# Patient Record
Sex: MALE | Race: BLACK OR AFRICAN AMERICAN
[De-identification: names, ages, dates, MRNs, and addresses within clinical notes are randomized per-mention and may not be internally consistent; named-entity substitution may affect disease eponyms.]

---

## 2020-11-24 ENCOUNTER — HOSPITAL ENCOUNTER (EMERGENCY)
Dept: HOSPITAL 72 - EMR | Age: 65
Discharge: HOME | End: 2020-11-24
Payer: MEDICARE

## 2020-11-24 VITALS — SYSTOLIC BLOOD PRESSURE: 142 MMHG | DIASTOLIC BLOOD PRESSURE: 85 MMHG

## 2020-11-24 VITALS — DIASTOLIC BLOOD PRESSURE: 85 MMHG | SYSTOLIC BLOOD PRESSURE: 142 MMHG

## 2020-11-24 VITALS — WEIGHT: 155 LBS | HEIGHT: 68 IN | BODY MASS INDEX: 23.49 KG/M2

## 2020-11-24 DIAGNOSIS — E11.9: ICD-10-CM

## 2020-11-24 DIAGNOSIS — K02.9: Primary | ICD-10-CM

## 2020-11-24 PROCEDURE — 99282 EMERGENCY DEPT VISIT SF MDM: CPT

## 2020-11-24 NOTE — NUR
ED Nurse Note:



Pt walked into the ED from home with c/o toothache onset 1 week. Pt came in 
with swellling on left molar area with pain that radiates to the ear. Pt is 
scheduled to see his dentist melinda for tooth extraction. pain 10/10, denies 
injury/trauma, no fever/chills. pt is taking pain and antibiotics

## 2020-11-24 NOTE — EMERGENCY ROOM REPORT
History of Present Illness


General


Chief Complaint:  Toothache


Source:  Patient





Present Illness


HPI


Is a 65-year-old male with history of diabetes.  He presents with chief 

complaint of dental pain.  Onset for last 2 days.  Localized to the left lower 

jaw.  He said he is supposed to get it extracted.  No fever chest pain or 

swelling.  Nothing made it better.  Eating or drinking made it worse.


Allergies:  


Coded Allergies:  


     No Known Allergies (Unverified , 11/24/20)





COVID-19 Screening


Contact w/high risk pt:  No


Experienced COVID-19 symptoms?:  No


COVID-19 Testing performed PTA:  No


COVID-19 Screening:  Negative COVID-19


COVID-19 Testing Source:  [place]





Patient History


Past Medical History:  see triage record, old chart reviewed, DM


Past Surgical History:  other


Pertinent Family History:  none


Social History:  Denies: smoking


Immunizations:  other


Reviewed Nursing Documentation:  PMH: Agreed; PSxH: Agreed





Nursing Documentation-PMH


Hx Diabetes:  Yes


Hx Neurological Problems:  Yes





Review of Systems


Eye:  Denies: eye pain, blurred vision


ENT:  Denies: ear pain, nose congestion, throat swelling


Respiratory:  Denies: cough, shortness of breath


Cardiovascular:  Denies: chest pain, palpitations


Gastrointestinal:  Denies: abdominal pain, diarrhea, nausea, vomiting


Musculoskeletal:  Denies: back pain, joint pain


Skin:  Denies: rash


Neurological:  Denies: headache, numbness


Endocrine:  Denies: increased thirst, increased urine


Hematologic/Lymphatic:  Denies: easy bruising


All Other Systems:  negative except mentioned in HPI





Physical Exam





Vital Signs








  Date Time  Temp Pulse Resp B/P (MAP) Pulse Ox O2 Delivery O2 Flow Rate FiO2


 


11/24/20 04:26 98.4 74 20 142/85 (104) 98 Room Air  





Vitals unremarkable


Sp02 EP Interpretation:  reviewed, normal


General Appearance:  well appearing, no apparent distress, alert


Head:  normocephalic, atraumatic


Eyes:  bilateral eye PERRL, bilateral eye EOMI


ENT:  hearing grossly normal, normal pharynx, other - Diffuse dental decay.  

Left lower jaw only has a decay left third molar.  No abscess seen.


Neck:  full range of motion, supple, no meningismus


Respiratory:  chest non-tender, lungs clear, normal breath sounds


Cardiovascular #1:  regular rate, rhythm, no murmur


Gastrointestinal:  normal bowel sounds, non tender, no mass, no organomegaly, no

bruit, non-distended


Musculoskeletal:  back normal, normal range of motion, gait/station normal


Psychiatric:  mood/affect normal





Medical Decision Making


Diagnostic Impression:  


   Primary Impression:  


   Toothache


ER Course


This patient presents with dental pain.  No evidence of any abscess to be I&D.  

Will discharge home.





Last Vital Signs








  Date Time  Temp Pulse Resp B/P (MAP) Pulse Ox O2 Delivery O2 Flow Rate FiO2


 


11/24/20 04:57 98.4 78 20 142/85 98 Room Air  








Status:  improved


Disposition:  HOME, SELF-CARE


Condition:  Stable


Scripts


Ibuprofen* (MOTRIN*) 600 Mg Tablet


600 MG ORAL Q6H PRN for For Pain, #30 TAB 0 Refills


   Prov: Wilman Wokrman MD         11/24/20 


Hydrocodone/Acetaminophen 5-325* (HYDROCODONE/ACETAMINOPHEN 5-325*) 1 Each 

Tablet


1 TAB ORAL Q6H PRN for For Pain, #5 TAB 0 Refills


   Prov: Wilman Workman MD         11/24/20 


Amoxicillin* (AMOXIL*) 500 Mg Capsule


500 MG ORAL THREE TIMES A DAY, #21 CAP


   Prov: Wilman Workman MD         11/24/20


Referrals:  


[facility] (PCP)


Patient Instructions:  Dental Pain





Additional Instructions:  


Follow-up with dentist ASAP.  Return if symptoms worsen.











Wilman Workman MD                  Nov 24, 2020 05:11

## 2021-01-27 ENCOUNTER — HOSPITAL ENCOUNTER (EMERGENCY)
Dept: HOSPITAL 72 - EMR | Age: 66
Discharge: HOME | End: 2021-01-27
Payer: MEDICARE

## 2021-01-27 VITALS — DIASTOLIC BLOOD PRESSURE: 84 MMHG | SYSTOLIC BLOOD PRESSURE: 127 MMHG

## 2021-01-27 VITALS — DIASTOLIC BLOOD PRESSURE: 86 MMHG | SYSTOLIC BLOOD PRESSURE: 123 MMHG

## 2021-01-27 VITALS — HEIGHT: 68 IN | WEIGHT: 155 LBS | BODY MASS INDEX: 23.49 KG/M2
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
## 2021-01-27 DIAGNOSIS — S16.1XXA: Primary | ICD-10-CM
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
## 2021-01-27 DIAGNOSIS — E11.9: ICD-10-CM

## 2021-01-27 DIAGNOSIS — M54.5: ICD-10-CM

## 2021-01-27 PROCEDURE — 99283 EMERGENCY DEPT VISIT LOW MDM: CPT

## 2021-01-27 PROCEDURE — 96372 THER/PROPH/DIAG INJ SC/IM: CPT

## 2021-01-27 NOTE — EMERGENCY ROOM REPORT
History of Present Illness


General


Chief Complaint:  Back Pain-No Injury


Source:  Patient





Present Illness


HPI


Patient presents with neck pain.  This is worsened over the last few days.  

Feels stiffness and has muscle aching.  He rates the pain 8/10.  It radiates 

down his upper back and also towards the shoulder.  He has difficulty raising 

his right arm and could not use a stick shift to bring himself to the emergency 

department because of the limitation in his right arm.  He denies any numbness. 

Is been taking Motrin with minimal relief.  He also is taking his daughters 

Naprosyn.  His last dose was earlier this morning.  Denies numbness or weakness 

in his hands.  There is no recent trauma.





The patient has chronic lower back problems.  He injured his lower back at work 

and there is litigation involved with this.  He does complain of some increased 

lower back pain at this time but it is not a significant diastasis neck.  He 

denies that his neck was involved with injury before.  However according to the 

triage note both problems have been present for 20 years.  Apparently physical 

therapy has been held up due to litigation.





The patient denies exposure to Covid positive contacts.





No fevers, chills, sore throat, chest pain, palpitations, nausea, vomiting, 

diarrhea, dysuria, abdominal pain, shortness of breath, rashes, visual changes, 

dizziness, headache.





In his history he reports suffering from depression and anxiety.


Allergies:  


Coded Allergies:  


     No Known Allergies (Unverified , 11/24/20)





COVID-19 Screening


Contact w/high risk pt:  No


Experienced COVID-19 symptoms?:  No


COVID-19 Testing performed PTA:  No





Patient History


Past Medical History:  see triage record


Social History:  Denies: smoking


Social History Narrative


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


Reviewed Nursing Documentation:  PMH: Agreed; PSxH: Agreed





Nursing Documentation-PMH


Past Medical History:  No History, Except For


Hx Diabetes:  Yes


Hx Neurological Problems:  Yes - depression, anxiety





Review of Systems


All Other Systems:  negative except mentioned in HPI





Physical Exam





Vital Signs








  Date Time  Temp Pulse Resp B/P (MAP) Pulse Ox O2 Delivery O2 Flow Rate FiO2


 


1/27/21 16:59 98.2 76 18 122/82 (95) 96 Room Air  








Sp02 EP Interpretation:  reviewed, normal


General Appearance:  well appearing, no apparent distress, GCS 15


Head:  normocephalic, atraumatic


Eyes:  bilateral eye normal inspection, bilateral eye PERRL, bilateral eye EOMI


ENT:  moist mucus membranes


Neck:  supple, no bony tend, tender - Right muscle straps extending to upper 

back and interscapular with muscle spasms


Respiratory:  normal inspection, chest non-tender


Cardiovascular #1:  regular rate, rhythm, no edema


Gastrointestinal:  normal inspection


Genitourinary:  no CVA tenderness


Musculoskeletal:  gait/station normal, tenderness - Lumbar area however less 

significant than neck able to sit and stand without difficulty


Neurologic:  alert, motor strength/tone normal, DTRs symmetric, oriented x3, 

sensory intact, cerebellar normal, speech normal


Psychiatric:  mood/affect normal


Skin:  normal color, no rash





Medical Decision Making


Diagnostic Impression:  


   Primary Impression:  


   Neck muscle strain


   Qualified Codes:  S16.1XXA - Strain of muscle, fascia and tendon at neck 

   level, initial encounter


   Additional Impression:  


   Back pain


   Qualified Codes:  M54.5 - Low back pain; G89.29 - Other chronic pain


ER Course


The patient presents with increased neck pain without trauma.  Differential 

includes osteoarthritis, muscle strain, torticollis amongst others.  There is no

evidence of radiculopathy at this time.  The patient has been taking anti-

inflammatory medications.  He is involved in some litigation and has not been 

involved with physical therapy recently.  Based on physical exam and lack of red

flag symptoms and signs imaging is not indicated at this time.  Patient is given

a shot of Toradol and Tylenol.





The patient is observed and states that his pain is still present but improved.





Discussed treatment plan with patient.  Discussed importance of physical therapy

and outpatient follow-up.





Of interest the patient is a [facility] patient and elected to come to our emergency

department.  He states that there is some problem with insurance after moving 

from the [facility].  He makes a point of stating that he can fill the 

prescription at [facility] as opposed to going to [facility] to fill the prescription.  

Cures not reviewed and small dose of Norco scribed to patient.





No medical emergency at this time.  Stable for outpatient observation and 

treatment.





Last Vital Signs








  Date Time  Temp Pulse Resp B/P (MAP) Pulse Ox O2 Delivery O2 Flow Rate FiO2


 


1/27/21 19:08 98.2 82 17 127/84 99 Room Air  








Status:  improved


Disposition:  HOME, SELF-CARE


Condition:  Improved


Scripts


Methocarbamol* (ROBAXIN-500*) 500 Mg Tablet


500 MG ORAL TID PRN for muscle spasm, #10 TAB 0 Refills


   Prov: Andrew Welch MD         1/27/21 


Hydrocodone Bit/Acetaminophen (HYDROCODON-ACETAMINOPHEN 5-300) 1 Each Tablet


1 EACH PO Q6HR PRN for For Pain, #8 TAB


   Prov: Andrew Welch MD         1/27/21 


Ibuprofen* (MOTRIN*) 600 Mg Tablet


600 MG ORAL Q6H PRN for FOR PAIN, #20 TAB 0 Refills


   Prov: Andrew Welch MD         1/27/21


Referrals:  


[facility] (PCP)











Andrew Welch MD                Jan 27, 2021 17:34

## 2021-01-27 NOTE — NUR
ED Nurse Note:

Pt walked into ED for back pain and neck pain 8/10 for 20 years. Pt states his 
pain has recently gotten worse. He denies wounds, recent injury, or fall. He is 
alert and ox4, ambulatory. Pt has been seen by [facility].

## 2021-02-01 ENCOUNTER — HOSPITAL ENCOUNTER (EMERGENCY)
Dept: HOSPITAL 72 - EMR | Age: 66
Discharge: HOME | End: 2021-02-01
Payer: MEDICARE

## 2021-02-01 VITALS — HEIGHT: 68 IN | BODY MASS INDEX: 24.25 KG/M2 | WEIGHT: 160 LBS

## 2021-02-01 VITALS — DIASTOLIC BLOOD PRESSURE: 65 MMHG | SYSTOLIC BLOOD PRESSURE: 137 MMHG

## 2021-02-01 DIAGNOSIS — M47.812: Primary | ICD-10-CM

## 2021-02-01 DIAGNOSIS — F41.9: ICD-10-CM

## 2021-02-01 DIAGNOSIS — E11.649: ICD-10-CM

## 2021-02-01 DIAGNOSIS — F32.9: ICD-10-CM

## 2021-02-01 PROCEDURE — 82962 GLUCOSE BLOOD TEST: CPT

## 2021-02-01 PROCEDURE — 96372 THER/PROPH/DIAG INJ SC/IM: CPT

## 2021-02-01 PROCEDURE — 99284 EMERGENCY DEPT VISIT MOD MDM: CPT

## 2021-02-01 PROCEDURE — 72125 CT NECK SPINE W/O DYE: CPT

## 2021-02-01 NOTE — DIAGNOSTIC IMAGING REPORT
EXAM:

  CT Cervical Spine Without Intravenous Contrast

 

CLINICAL HISTORY:

  PAIN

 

TECHNIQUE:

  Axial computed tomography images of the cervical spine without 

intravenous contrast.  CTDI is 20.20 mGy and DLP is 590.1 mGy-cm.  One or 

more of the following dose reduction techniques were used: automated 

exposure control, adjustment of the mA and/or kV according to patient 

size, use of iterative reconstruction technique.

 

COMPARISON:

  None.

 

FINDINGS:

  Vertebrae:  There is multilevel spondylosis with degenerative arthritis 

and osteophyte formation more so along the anterior vertebral bodies 

throughout the cervical spine.  Multilevel bilateral apophyseal 

hypertrophy.  Variable degrees of bilateral neuroforaminal encroachment 

seen.  No distinct central canal stenosis.  Normal alignment.  No acute 

fracture.

  Discs/spinal canal/neural foramina:  There is degenerative arthrosis 

involving the anterior C1-C2 articulation.  No spinal canal stenosis.

  Soft tissues:  Unremarkable.

  Pleural space:  Lung apices revealed no pneumothorax.  Mild biapical 

pleural thickening.

 

IMPRESSION:     

  Multilevel degenerative disease as described.  No acute fracture or 

subluxation.

## 2021-02-01 NOTE — EMERGENCY ROOM REPORT
History of Present Illness


General


Chief Complaint:  Neck Pain


Source:  Patient





Present Illness


HPI


65-year-old male presents for evaluation.  Complaining of neck pain.  Has had 

neck pain and back pain for years due to old injuries.  Was seen here a few days

ago.  States the pain meds did not help.  Throbbing, 10 out of 10, nonradiating.

 Denies headache.  Denies fevers or chills.  No other aggravating relieving 

factors.  Denies any other associated symptoms


Allergies:  


Coded Allergies:  


     No Known Allergies (Unverified , 11/24/20)





COVID-19 Screening


Contact w/high risk pt:  No


Experienced COVID-19 symptoms?:  No


COVID-19 Testing performed PTA:  No





Patient History


Past Medical History:  DM, psych hx


Past Surgical History:  none


Pertinent Family History:  none


Social History:  Denies: smoking, alcohol use, drug use


Immunizations:  UTD


Reviewed Nursing Documentation:  PMH: Agreed; PSxH: Agreed





Nursing Documentation-PMH


Past Medical History:  No History, Except For


Hx Cardiac Problems:  No - neck pain


Hx Diabetes:  Yes


Hx Neurological Problems:  Yes - depression, anxiety





Review of Systems


All Other Systems:  negative except mentioned in HPI





Physical Exam





Vital Signs








  Date Time  Temp Pulse Resp B/P (MAP) Pulse Ox O2 Delivery O2 Flow Rate FiO2


 


2/1/21 16:29 98.2 76 16 135/68 (90) 98 Room Air  








Sp02 EP Interpretation:  reviewed, normal


General Appearance:  no apparent distress, alert, GCS 15, non-toxic


Head:  normocephalic, atraumatic


Eyes:  bilateral eye normal inspection, bilateral eye PERRL


ENT:  hearing grossly normal, normal pharynx, no angioedema, normal voice


Neck:  full range of motion, no meningismus, supple/symm/no masses, tender 

midline


Respiratory:  chest non-tender, lungs clear, normal breath sounds, speaking full

sentences


Cardiovascular #1:  regular rate, rhythm, no edema


Cardiovascular #2:  2+ carotid (R), 2+ carotid (L), 2+ radial (R), 2+ radial 

(L), 2+ dorsalis pedis (R), 2+ dorsalis pedis (L)


Gastrointestinal:  normal bowel sounds, non tender, soft, non-distended, no 

guarding, no rebound


Rectal:  deferred


Genitourinary:  normal inspection, no CVA tenderness


Musculoskeletal:  back normal, normal range of motion, gait/station normal, non-

tender


Neurologic:  alert, motor strength/tone normal, oriented x3, sensory intact, 

responsive, speech normal


Psychiatric:  judgement/insight normal, memory normal, mood/affect normal, no 

suicidal/homicidal ideation


Reflexes:  3+ bicep (R), 3+ bicep (L), 3+ tricep (R), 3+ tricep (L), 3+ knee 

(R), 3+ knee (L)


Skin:  no rash


Lymphatic:  no adenopathy





Medical Decision Making


Diagnostic Impression:  


   Primary Impression:  


   Neck pain


   Additional Impression:  


   Hypoglycemia


ER Course


Hospital Course 


65-year-old male presents with neck pain. Unrelieved with medication from recent

ER visit





Differential diagnoses include: Fracture, dislocation, sprain, contusion





Clinical course


Patient placed on stretcher.  After initial history and physical, I ordered pain

medications and CT C-spine





Patient asked us to check his blood sugar as he skipped a meal today. Takes 

Lantus at night. Glucose was 40. Given food and drink. States he feels better.





CT C-spine shows multilevel degenerative disease





I discussed with patient. Patient would likely require Ortho evaluation possible

physical therapy versus pain injection versus surgery. States he has discussed 

surgical options previously. I will provide referrals. Safe for discharge with 

close outpatient follow-up





Repeat Accu-Chek within normal limits.





Diagnosis - neck pain, hypoglycemia





Stable and discharged to home.  weight bear as tolerated.  Followup with ortho. 

Return to ED if symptoms recur or worsen


CT/MRI/US Diagnostic Results


CT/MRI/US Diagnostic Results :  


   Imaging Test Ordered:  CT C spine


   Impression


Procedure: CT C Spine no Contrast





EXAM:


  CT Cervical Spine Without Intravenous Contrast


 


CLINICAL HISTORY:


  PAIN


 


TECHNIQUE:


  Axial computed tomography images of the cervical spine without 


intravenous contrast.  CTDI is 20.20 mGy and DLP is 590.1 mGy-cm.  One or 


more of the following dose reduction techniques were used: automated 


exposure control, adjustment of the mA and/or kV according to patient 


size, use of iterative reconstruction technique.


 


COMPARISON:


  None.


 


FINDINGS:


  Vertebrae:  There is multilevel spondylosis with degenerative arthritis 


and osteophyte formation more so along the anterior vertebral bodies 


throughout the cervical spine.  Multilevel bilateral apophyseal 


hypertrophy.  Variable degrees of bilateral neuroforaminal encroachment 


seen.  No distinct central canal stenosis.  Normal alignment.  No acute 


fracture.


  Discs/spinal canal/neural foramina:  There is degenerative arthrosis 


involving the anterior C1-C2 articulation.  No spinal canal stenosis.


  Soft tissues:  Unremarkable.


  Pleural space:  Lung apices revealed no pneumothorax.  Mild biapical 


pleural thickening.


 


IMPRESSION:     


  Multilevel degenerative disease as described.  No acute fracture or 


subluxation.





Last Vital Signs








  Date Time  Temp Pulse Resp B/P (MAP) Pulse Ox O2 Delivery O2 Flow Rate FiO2


 


2/1/21 16:29 98.2 76 16 135/68 (90) 98 Room Air  








Status:  improved


Disposition:  HOME, SELF-CARE


Condition:  Stable


Scripts


Lidocaine Patch* (Lidoderm Patch*) 1 Each Adh..patch


1 PATCH TOPIC DAILY, #7 PATCH 0 Refills


   Patch(es) may remain in place for up to 12 hours in any 24-hour


   period.


   Prov: Schuyler Graves MD         2/1/21


Referrals:  


[facility] (PCP)











Schuyler Graves MD             Feb 1, 2021 17:35